# Patient Record
Sex: FEMALE | Race: WHITE | NOT HISPANIC OR LATINO
[De-identification: names, ages, dates, MRNs, and addresses within clinical notes are randomized per-mention and may not be internally consistent; named-entity substitution may affect disease eponyms.]

---

## 2024-05-28 ENCOUNTER — APPOINTMENT (OUTPATIENT)
Dept: ORTHOPEDIC SURGERY | Facility: CLINIC | Age: 29
End: 2024-05-28
Payer: SELF-PAY

## 2024-05-28 ENCOUNTER — APPOINTMENT (OUTPATIENT)
Dept: ORTHOPEDIC SURGERY | Facility: CLINIC | Age: 29
End: 2024-05-28

## 2024-05-28 VITALS — WEIGHT: 235 LBS | BODY MASS INDEX: 36.88 KG/M2 | HEIGHT: 67 IN

## 2024-05-28 DIAGNOSIS — Z78.9 OTHER SPECIFIED HEALTH STATUS: ICD-10-CM

## 2024-05-28 PROBLEM — Z00.00 ENCOUNTER FOR PREVENTIVE HEALTH EXAMINATION: Status: ACTIVE | Noted: 2024-05-28

## 2024-05-28 PROCEDURE — 73600 X-RAY EXAM OF ANKLE: CPT | Mod: RT

## 2024-05-28 PROCEDURE — 99203 OFFICE O/P NEW LOW 30 MIN: CPT

## 2024-05-28 RX ORDER — NORGESTREL AND ETHINYL ESTRADIOL 0.3-0.03MG
KIT ORAL
Refills: 0 | Status: ACTIVE | COMMUNITY

## 2024-05-28 RX ORDER — OMEPRAZOLE 20 MG/1
20 CAPSULE, DELAYED RELEASE ORAL
Refills: 0 | Status: ACTIVE | COMMUNITY

## 2024-05-28 NOTE — ASSESSMENT
[FreeTextEntry1] : Xrays reviewed with patient Treatment options discussed  Placed in short leg splint Recommend tall cam - will obtain on own - WBAT in cam boot otc anti-inflammatories / tylenol DIscussd importance of ice / elevation Follow up with ankle specialist

## 2024-05-28 NOTE — HISTORY OF PRESENT ILLNESS
[Right Leg] : right leg [Sudden] : sudden [5] : 5 [Tingling] : tingling [Constant] : constant [Nothing helps with pain getting better] : Nothing helps with pain getting better [Standing] : standing [Walking] : walking [de-identified] : 5/28/24: pt is a 28 y/o female with rt ankle pain. pt states that she fell off a hill. injury occurred on 5/25/24. no hx of previous injury to rt ankle. pt c/o that when she stands on her rt foot, the pain spreads to her calf and swells. pt has been taking advil. pt c/o of swelling and pins and needles.  [] : no [FreeTextEntry1] : ankle  [FreeTextEntry7] : rt calf  [de-identified] : motion  [de-identified] : none

## 2024-05-28 NOTE — PHYSICAL EXAM
[Moderate] : moderate diffused ankle swelling [Mild] : mild swelling of calf [5___] : plantar flexion 5[unfilled]/5 [2+] : posterior tibialis pulse: 2+ [] : patient ambulates without assistive device [Right] : right ankle [Lateral malleolus fracture] : Lateral malleolus fracture [TWNoteComboBox7] : dorsiflexion 0 degrees

## 2024-06-03 ENCOUNTER — APPOINTMENT (OUTPATIENT)
Dept: ORTHOPEDIC SURGERY | Facility: CLINIC | Age: 29
End: 2024-06-03
Payer: SELF-PAY

## 2024-06-03 VITALS — WEIGHT: 230 LBS | BODY MASS INDEX: 36.1 KG/M2 | HEIGHT: 67 IN

## 2024-06-03 DIAGNOSIS — S93.431A SPRAIN OF TIBIOFIBULAR LIGAMENT OF RIGHT ANKLE, INITIAL ENCOUNTER: ICD-10-CM

## 2024-06-03 DIAGNOSIS — S82.61XA DISPLACED FRACTURE OF LATERAL MALLEOLUS OF RIGHT FIBULA, INITIAL ENCOUNTER FOR CLOSED FRACTURE: ICD-10-CM

## 2024-06-03 PROCEDURE — 99203 OFFICE O/P NEW LOW 30 MIN: CPT | Mod: 57

## 2024-06-03 PROCEDURE — 73600 X-RAY EXAM OF ANKLE: CPT | Mod: RT

## 2024-06-03 NOTE — IMAGING
[de-identified] : Patient ambulates with a Antalgic gait   Right Foot and Ankle: Inspection: Erythema: None Swelling: Diffuse swelling laterally, anteriorly, medially Ecchymosis: None Abrasions: None Rashes: None Surgical Scars: None Effusion: None Atrophy: None Deformity: None Pes Garrison Valgus: Negative Pes Cavus: Negative Hallux Valgus: Negative Clawtoe/Hammertoe: Negative  Palpation: Crepitus: None Proximal Fibula: Nontender Distal Fibula: Nontender Medial Malleolus: tender Lateral Malleolus: tender AITFL: tender PITFL: Nontender ATFL: tender CFL: tender Deltoid: tender Calcaneus: Nontender Talar Head/Neck: Nontender Lateral Process of Talus: Nontender Anterior Facet of Calcaneus: Nontender Peroneal Tendons: tender Posterior Tibialis: Nontender Achilles Tendon: Nontender & Intact Achilles Insertion: Nontender Retrocalcaneal Bursa: Nontender Anterior Capsule: tender Subtalar Joint: Nontender Talonavicular Joint: Nontender Calcaneocuboid Joint: Nontender Heel pad: Nontender Medial Tubercle of Calcaneus: Nontender Plantar Fascia: Nontender Midfoot: Nontender Forefoot: Nontender Sesamoids: Nontender  ROM: deferred  Motor: deferred   Neurologic Exam: L4-S1 Sensation: Grossly Intact Tinels: Negative  Vascular Exam/Pulses: Dorsalis Pedis: 2+ Posterior Tibialis: 2+ Capillary Refill: <2 Seconds Other Exams: None Pertinent Contralateral Findings: None

## 2024-06-03 NOTE — HISTORY OF PRESENT ILLNESS
[de-identified] : Date of Injury/Onset:  05/25/2025 Pain: At Rest: 2 With Activity: 6 Mechanism of injury: This is NOT a Work Related Injury being treated under Worker's Compensation. This is NOT an athletic injury occurring associated with an interscholastic or organized sports team. Quality of symptoms: swelling, tingling Improves with: elevated, Advil Worse with: walking Prior treatment: crutches, CAMBOOT Prior Imaging: x-rays Reports Available For Review Today: yes  Out of work/sport: working School/Sport/Position/Occupation:    06/03/2024 MITCHEL 29 year F is here today for evaluation of right ankle. Patient states that she fell off a hill. injury occurred on 5/25/24. no hx of previous injury to rt ankle. pt c/o that when she stands on her rt foot, the pain spreads to her calf and swells. pt has been taking Advil. pt c/o of swelling and pins and needles. Patient went to [facility] 05/28 had x-rays done, was sent home with crutches and CAMBOOT. Patient state pain is worse when walking.   denies hx of prior surgery to ankle  endorses smoking every day denies illicit drug use denies any medical problems for which she takes medication

## 2024-06-03 NOTE — DISCUSSION/SUMMARY
[de-identified] :  Patient seen and examined. Patient presents today for evaluation of right ankle pain. Based on history, physical examination, imaging I discussed with her that her symptoms are related to her bimalleolar equivalent ankle fracture. We discussed that due to the fact that she has an unstable ankle fracture surgical fixation is necessary to help anatomically reduce her ankle, additionally it will provide the fastest time to union and return to activities, which patient is interested in, and mitigate her risk for progressive arthrosis well improving her chance at an optimal recovery. We discussed the risks and benefits of surgery which include but not limited to soft tissue/wound complications, bleeding, infection, neurologic injury, malunion nonunion, potential loss of life, potential need for reoperation potential need for subsequent procedures in the future. We discussed that surgical fixation would be a combination of plates and screws to address all fractured pathology. We discussed surgical timing. Due to the fact that she is grossly swollen in her left lower extremity it is currently not safe to proceed with surgery at this time. Plan will be for surgery next week. Preoperative informative folder will be given to patient which includes preop instructions regarding weightbearing status and instructions on keeping splint clean and dry as well as how to navigate the weightbearing status. Medical clearance forms given to patient for her to be evaluated by her primary care doctor for medical clearance. Plan to see patient back in 1 week to check on the swelling of her leg and foot. Should her swelling significantly improved plan to proceed with surgery that week. I discussed with patient and her significant other that is not uncommon to require a week or 2 of soft tissue rest to allow for the skin to be amenable for surgery. They understand that we are cautiously optimistic to be able to perform his surgery next week but agree that we should only proceed when it is safe for her to do so. We went over the postoperative protocol which would include nonweightbearing for at minimum 4 weeks. She was the walker or crutch ambulating in a splint for 2 weeks followed by a boot. Sutures would plan to be removed somewhere between 2 to 4 weeks depending on how she is healing. Modifiable risk factors that she can control is increasing her vitamin D as well as having a well-balanced diet, and abstaining from any nicotine or alcohol products. She confirms that she does not use those anywhere. All questions asked and answered. Plan for patient to follow-up in 1 week for wound check.    I discussed with patient that I will determine what her postoperative cast/boot situation will be dependent on if a deltoid ligament repair is performed. I discussed with them that if it is I will keep her in a cast for another 2 to 4 weeks depending on the healing. I discussed with them that I will plan to see her back 1 more time before surgery to go over risk and benefits 1 more time as well as to assess for swelling. All questions were asked and answered. Patient is agreement with the plan follow-up accordingly.  Modifiable risk factors: Smoking- smokes every day Nicotine- smokes every day Vaping- denies  Illicit substances- denies   Nonmodifiable risk factors: DM/hypothyroidism/Osteopenia: denies  next visit: order preop meds,